# Patient Record
Sex: MALE | URBAN - METROPOLITAN AREA
[De-identification: names, ages, dates, MRNs, and addresses within clinical notes are randomized per-mention and may not be internally consistent; named-entity substitution may affect disease eponyms.]

---

## 2023-06-09 ENCOUNTER — OFFICE VISIT (OUTPATIENT)
Dept: URBAN - METROPOLITAN AREA CLINIC 22 | Facility: CLINIC | Age: 23
End: 2023-06-09

## 2023-06-09 DIAGNOSIS — H47.093 OTHER DISORDER OF BILATERAL OPTIC NERVE: Primary | ICD-10-CM

## 2023-06-09 PROCEDURE — 92133 CPTRZD OPH DX IMG PST SGM ON: CPT | Performed by: STUDENT IN AN ORGANIZED HEALTH CARE EDUCATION/TRAINING PROGRAM

## 2023-06-09 PROCEDURE — 99203 OFFICE O/P NEW LOW 30 MIN: CPT | Performed by: STUDENT IN AN ORGANIZED HEALTH CARE EDUCATION/TRAINING PROGRAM

## 2023-06-09 ASSESSMENT — VISUAL ACUITY
OS: 20/20
OD: 20/20

## 2023-06-09 ASSESSMENT — INTRAOCULAR PRESSURE
OD: 10
OS: 10

## 2023-06-09 NOTE — IMPRESSION/PLAN
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
--OCT 06/09/23:  bdl inf/lori OS 92 bdl sup/lori Plan: Discussed findings. OCT RNFL: borderline thinning OU. Possible disc drusen OU. RTC next available HVF 30-2.

## 2023-06-16 ENCOUNTER — OFFICE VISIT (OUTPATIENT)
Dept: URBAN - METROPOLITAN AREA CLINIC 22 | Facility: CLINIC | Age: 23
End: 2023-06-16

## 2023-06-16 DIAGNOSIS — H47.093 OTHER DISORDERS OF OPTIC NERVE, NOT ELSEWHERE CLASSIFIED, BILATERAL: Primary | ICD-10-CM

## 2023-06-16 PROCEDURE — 92083 EXTENDED VISUAL FIELD XM: CPT | Performed by: STUDENT IN AN ORGANIZED HEALTH CARE EDUCATION/TRAINING PROGRAM

## 2023-06-16 NOTE — IMPRESSION/PLAN
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
--OCT 06/09/23:  bdl inf/lori OS 92 bdl sup/lori
--HVF 06/16/23: OU edge misses Plan: Discussed findings. Stable VA, nerve appearance. Denies HA or vision changes. HVF low reliability due to poor fixation OU. No BS mapped OD. Scattered edge misses. Repeat VF in 3-4 months.